# Patient Record
Sex: FEMALE | Race: WHITE | NOT HISPANIC OR LATINO | Employment: OTHER | URBAN - METROPOLITAN AREA
[De-identification: names, ages, dates, MRNs, and addresses within clinical notes are randomized per-mention and may not be internally consistent; named-entity substitution may affect disease eponyms.]

---

## 2017-09-26 ENCOUNTER — APPOINTMENT (OUTPATIENT)
Dept: WOMENS IMAGING | Facility: HOSPITAL | Age: 72
End: 2017-09-26

## 2017-09-26 PROCEDURE — 77063 BREAST TOMOSYNTHESIS BI: CPT | Performed by: RADIOLOGY

## 2017-09-26 PROCEDURE — G0202 SCR MAMMO BI INCL CAD: HCPCS | Performed by: RADIOLOGY

## 2018-10-03 ENCOUNTER — APPOINTMENT (OUTPATIENT)
Dept: WOMENS IMAGING | Facility: HOSPITAL | Age: 73
End: 2018-10-03

## 2018-10-03 PROCEDURE — 77067 SCR MAMMO BI INCL CAD: CPT | Performed by: RADIOLOGY

## 2018-10-03 PROCEDURE — 77063 BREAST TOMOSYNTHESIS BI: CPT | Performed by: RADIOLOGY

## 2018-10-25 ENCOUNTER — APPOINTMENT (OUTPATIENT)
Dept: WOMENS IMAGING | Facility: HOSPITAL | Age: 73
End: 2018-10-25

## 2018-10-25 PROCEDURE — 77080 DXA BONE DENSITY AXIAL: CPT | Performed by: RADIOLOGY

## 2019-10-11 ENCOUNTER — APPOINTMENT (OUTPATIENT)
Dept: WOMENS IMAGING | Facility: HOSPITAL | Age: 74
End: 2019-10-11

## 2019-10-11 PROCEDURE — 77067 SCR MAMMO BI INCL CAD: CPT | Performed by: RADIOLOGY

## 2019-10-11 PROCEDURE — 77063 BREAST TOMOSYNTHESIS BI: CPT | Performed by: RADIOLOGY

## 2020-10-13 ENCOUNTER — APPOINTMENT (OUTPATIENT)
Dept: WOMENS IMAGING | Facility: HOSPITAL | Age: 75
End: 2020-10-13

## 2020-10-13 PROCEDURE — 77067 SCR MAMMO BI INCL CAD: CPT | Performed by: RADIOLOGY

## 2020-10-13 PROCEDURE — 77063 BREAST TOMOSYNTHESIS BI: CPT | Performed by: RADIOLOGY

## 2021-10-26 ENCOUNTER — APPOINTMENT (OUTPATIENT)
Dept: WOMENS IMAGING | Facility: HOSPITAL | Age: 76
End: 2021-10-26

## 2021-10-26 PROCEDURE — 77067 SCR MAMMO BI INCL CAD: CPT | Performed by: RADIOLOGY

## 2021-10-26 PROCEDURE — 77063 BREAST TOMOSYNTHESIS BI: CPT | Performed by: RADIOLOGY

## 2021-11-30 ENCOUNTER — OFFICE VISIT (OUTPATIENT)
Dept: CARDIOLOGY | Facility: CLINIC | Age: 76
End: 2021-11-30

## 2021-11-30 VITALS
WEIGHT: 236.6 LBS | HEART RATE: 66 BPM | HEIGHT: 67 IN | SYSTOLIC BLOOD PRESSURE: 170 MMHG | DIASTOLIC BLOOD PRESSURE: 84 MMHG | BODY MASS INDEX: 37.13 KG/M2

## 2021-11-30 DIAGNOSIS — E66.01 CLASS 2 SEVERE OBESITY DUE TO EXCESS CALORIES WITH SERIOUS COMORBIDITY AND BODY MASS INDEX (BMI) OF 37.0 TO 37.9 IN ADULT (HCC): ICD-10-CM

## 2021-11-30 DIAGNOSIS — R07.2 PRECORDIAL PAIN: Primary | ICD-10-CM

## 2021-11-30 DIAGNOSIS — M33.90 DERMATOMYOSITIS (HCC): ICD-10-CM

## 2021-11-30 DIAGNOSIS — I10 ESSENTIAL HYPERTENSION: ICD-10-CM

## 2021-11-30 PROBLEM — E66.812 CLASS 2 SEVERE OBESITY DUE TO EXCESS CALORIES WITH SERIOUS COMORBIDITY AND BODY MASS INDEX (BMI) OF 37.0 TO 37.9 IN ADULT: Status: ACTIVE | Noted: 2021-11-30

## 2021-11-30 PROBLEM — M33.13 DERMATOMYOSITIS: Status: ACTIVE | Noted: 2021-11-30

## 2021-11-30 PROCEDURE — 99204 OFFICE O/P NEW MOD 45 MIN: CPT | Performed by: INTERNAL MEDICINE

## 2021-11-30 PROCEDURE — 93000 ELECTROCARDIOGRAM COMPLETE: CPT | Performed by: INTERNAL MEDICINE

## 2021-11-30 RX ORDER — HYDROXYCHLOROQUINE SULFATE 200 MG/1
TABLET, FILM COATED ORAL DAILY
COMMUNITY

## 2021-11-30 NOTE — PROGRESS NOTES
Date of Office Visit: 21  Encounter Provider: Chuck Jones MD  Place of Service: [facility] CARDIOLOGY  Patient Name: Marlee Brooks  :1945  [id number]    Chief Complaint   Patient presents with   • Chest Pain   :     HPI: Marlee Brooks is a 76 y.o. female she is here as a new patient for evaluation of chest pain. She has a history of dermatomyositis and is on chronic hydroxychloroquine she has had a history of hypertension no history of diabetes tobacco abuse. She is an artist and teaches it. She was walking about a week ago [place] and got chest discomfort and then short of breath probably lasted about 10 minutes she had to sit down and then it resolved in about 5 minutes. She did not have any nausea or vomiting but she is recently had some belching about a month ago she got into some poison oak and had a real issue with that and has had to have steroids a couple of times. She is never had heart problems before and she feels pretty good now her blood pressure usually goes up when she goes to the physician    Past Medical History:   Diagnosis Date   • Asthma    • Bilateral hearing loss    • Dermatomyositis (HCC)    • Hypertension    • Obstructive sleep apnea        Past Surgical History:   Procedure Laterality Date   • CATARACT EXTRACTION     • OVARIAN CYST REMOVAL         Social History     Socioeconomic History   • Marital status: Single   Tobacco Use   • Smoking status: Former Smoker     Quit date:      Years since quittin.9   • Smokeless tobacco: Never Used   • Tobacco comment: QUIT IN    Substance and Sexual Activity   • Alcohol use: No       Family History   Problem Relation Age of Onset   • No Known Problems Father          at 32 of Car Accident   • Hypertension Mother    • Colon cancer Sister    • Lung cancer Sister        Review of Systems   Constitutional: Negative for decreased appetite, fever,  malaise/fatigue and weight loss.   HENT: Negative for nosebleeds.    Eyes: Negative for double vision.   Cardiovascular: Negative for chest pain, claudication, cyanosis, dyspnea on exertion, irregular heartbeat, leg swelling, near-syncope, orthopnea, palpitations, paroxysmal nocturnal dyspnea and syncope.   Respiratory: Negative for cough, hemoptysis and shortness of breath.    Hematologic/Lymphatic: Negative for bleeding problem.   Skin: Negative for rash.   Musculoskeletal: Negative for falls and myalgias.   Gastrointestinal: Negative for hematochezia, jaundice, melena, nausea and vomiting.   Genitourinary: Negative for hematuria.   Neurological: Negative for dizziness and seizures.   Psychiatric/Behavioral: Negative for altered mental status and memory loss.       Allergies   Allergen Reactions   • Sulfa Antibiotics Shortness Of Breath, Rash and Swelling     Category: Adverse Reaction; Annotation - [id number]: lesions on body     • Ace Inhibitors Cough     COUGH  Category: Adverse Reaction;   COUGH     • Azathioprine Diarrhea     Category: Allergy;    • Cephalexin Hallucinations     HALLUCINATIONS  HALLUCINATIONS     • Hyoscyamine Hallucinations     HALLUCINATIONS  HALLUCINATIONS     • Phenazopyridine Other (See Comments)     Category: Adverse Reaction; Annotation - [id number]: issue with her eyes   • Triamterene-Hctz Swelling     Category: Adverse Reaction; Annotation - [id number]: lesions on body   • Valsartan Cough     Category: Allergy;          Current Outpatient Medications:   •  amLODIPine (NORVASC) 5 MG tablet, Take 5 mg by mouth Daily., Disp: , Rfl:   •  hydroxychloroquine (PLAQUENIL) 200 MG tablet, Take  by mouth Daily., Disp: , Rfl:   •  nebivolol (BYSTOLIC) 20 MG tablet, Take 20 mg by mouth Daily., Disp: , Rfl:   •  azithromycin (ZITHROMAX Z-OBIE) 250 MG tablet, Take 2 tablets at the same time on Day 1 and then 1 tablet every 24 hours thereafter., Disp: 6 tablet, Rfl: 1  •  MethylPREDNISolone (MEDROL) 4  "MG tablet, follow package directions, Disp: 1 each, Rfl: 0      Objective:     Vitals:    11/30/21 1532   BP: 170/84   Pulse: 66   Weight: 107 kg (236 lb 9.6 oz)   Height: 168.9 cm (66.5\")     Body mass index is 37.62 kg/m².    Constitutional:       Appearance: Well-developed.   Eyes:      General: No scleral icterus.  HENT:      Head: Normocephalic.   Neck:      Thyroid: No thyromegaly.      Vascular: No JVD.      Lymphadenopathy: No cervical adenopathy.   Pulmonary:      Effort: Pulmonary effort is normal.      Breath sounds: Normal breath sounds. No wheezing. No rales.   Cardiovascular:      Normal rate. Regular rhythm.      No gallop.   Edema:     Peripheral edema absent.   Abdominal:      Palpations: Abdomen is soft.      Tenderness: There is no abdominal tenderness.   Musculoskeletal: Normal range of motion. Skin:     General: Skin is warm and dry.      Findings: No rash.   Neurological:      Mental Status: Alert and oriented to person, place, and time.           ECG 12 Lead    Date/Time: 11/30/2021 4:27 PM  Performed by: Chuck Jones MD  Authorized by: Chuck Jones MD   Previous ECG: no previous ECG available  Rhythm: sinus rhythm    Clinical impression: normal ECG             Assessment:       Diagnosis Plan   1. Precordial pain     2. Essential hypertension     3. Class 2 severe obesity due to excess calories with serious comorbidity and body mass index (BMI) of 37.0 to 37.9 in adult (HCC)     4. Dermatomyositis (HCC)            Plan:       Well her EKG looks normal her exam is normal but her story is a little concerning she is definitely intermediate risk with her hypertension and her obesity I feel like we probably ought to get a stress test on her and make sure she is okay. I am going to do a treadmill Cardiolite and if that is normal then I think we watch things if it is not then we will have to consider a cardiac cath it depends on how abnormal it is    As always, it has been a pleasure to " participate in your patient's care.      Sincerely,       Chuck Jones MD

## 2022-09-18 ENCOUNTER — APPOINTMENT (OUTPATIENT)
Dept: GENERAL RADIOLOGY | Facility: HOSPITAL | Age: 77
End: 2022-09-18

## 2022-09-18 ENCOUNTER — HOSPITAL ENCOUNTER (EMERGENCY)
Facility: HOSPITAL | Age: 77
Discharge: HOME | End: 2022-09-18
Attending: EMERGENCY MEDICINE | Admitting: EMERGENCY MEDICINE

## 2022-09-18 ENCOUNTER — APPOINTMENT (OUTPATIENT)
Dept: CT IMAGING | Facility: HOSPITAL | Age: 77
End: 2022-09-18

## 2022-09-18 VITALS
SYSTOLIC BLOOD PRESSURE: 183 MMHG | WEIGHT: 220 LBS | HEIGHT: 60 IN | HEART RATE: 64 BPM | DIASTOLIC BLOOD PRESSURE: 74 MMHG | OXYGEN SATURATION: 97 % | BODY MASS INDEX: 43.19 KG/M2 | RESPIRATION RATE: 16 BRPM | TEMPERATURE: 99.1 F

## 2022-09-18 DIAGNOSIS — T07.XXXA ABRASIONS OF MULTIPLE SITES: ICD-10-CM

## 2022-09-18 DIAGNOSIS — S16.1XXA STRAIN OF NECK MUSCLE, INITIAL ENCOUNTER: ICD-10-CM

## 2022-09-18 DIAGNOSIS — S09.90XA CLOSED HEAD INJURY, INITIAL ENCOUNTER: Primary | ICD-10-CM

## 2022-09-18 DIAGNOSIS — S00.83XA CONTUSION OF FACE, INITIAL ENCOUNTER: ICD-10-CM
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
## 2022-09-18 PROCEDURE — 99283 EMERGENCY DEPT VISIT LOW MDM: CPT

## 2022-09-18 PROCEDURE — 70486 CT MAXILLOFACIAL W/O DYE: CPT

## 2022-09-18 PROCEDURE — 72125 CT NECK SPINE W/O DYE: CPT

## 2022-09-18 PROCEDURE — 70450 CT HEAD/BRAIN W/O DYE: CPT

## 2022-09-18 PROCEDURE — 73110 X-RAY EXAM OF WRIST: CPT

## 2022-09-18 PROCEDURE — 73560 X-RAY EXAM OF KNEE 1 OR 2: CPT

## 2022-09-18 NOTE — ED NOTES
Pt to ED via EMS from [facility]. Pt had mechanical fall at the mall and hit L side of head. Pt reports she tripped on a crack. Denies LOC. Denies blood thinners. Pt alert and oriented x4. Hx hypertension.

## 2022-09-18 NOTE — ED PROVIDER NOTES
EMERGENCY DEPARTMENT ENCOUNTER    Room Number:  01/01  Date of encounter:  9/18/2022  PCP: Sheryl Rubin DO  Historian: Patient        PPE    Patient was placed in face mask in first look. Patient was wearing facemask when I entered the room and throughout our encounter. I wore full protective equipment throughout this patient encounter including a face mask, and gloves. Hand hygiene was performed before donning protective equipment and after removal when leaving the room.        HPI:  Chief Complaint: Fall  A complete HPI/ROS/PMH/PSH/SH/FH are unobtainable due to: Nothing    Context: Marlee Brooks is a 77 y.o. female with a history of hypertension who arrives to the ED via EMS.  Patient presents with c/o head injury status post a trip and fall prior to arrival.  Patient states that she was walking out of [place] when she tripped on the sidewalk causing her to fall forward and hit her head and face on the sidewalk.  She states she was able to get up with assistance and has been able to ambulate since the fall.   Patient also complains of left-sided facial pain, facial abrasion, left knee pain, right wrist pain and neck pain.  Patient states she also has abrasions to her left knee and her right great toe.  Patient denies LOC, dizziness, lightheadedness, nausea, vomiting, visual changes, headache, back pain, symptoms prior to the fall including chest pain or shortness of breath.  Patient states that nothing makes the symptoms better and movement worsens symptoms.    Patient states her tetanus shot is up-to-date and she is not anticoagulated.      PAST MEDICAL HISTORY  Active Ambulatory Problems     Diagnosis Date Noted   • Essential hypertension 11/30/2021   • Class 2 severe obesity due to excess calories with serious comorbidity and body mass index (BMI) of 37.0 to 37.9 in adult ([facility]) 11/30/2021   • Dermatomyositis ([facility]) 11/30/2021     Resolved Ambulatory Problems     Diagnosis Date Noted   • No  Resolved Ambulatory Problems     Past Medical History:   Diagnosis Date   • Asthma    • Bilateral hearing loss    • Hypertension    • Obstructive sleep apnea          PAST SURGICAL HISTORY  Past Surgical History:   Procedure Laterality Date   • CATARACT EXTRACTION     • OVARIAN CYST REMOVAL           FAMILY HISTORY  Family History   Problem Relation Age of Onset   • No Known Problems Father          at 32 of Car Accident   • Hypertension Mother    • Colon cancer Sister    • Lung cancer Sister          SOCIAL HISTORY  Social History     Socioeconomic History   • Marital status:    Tobacco Use   • Smoking status: Former Smoker     Quit date:      Years since quittin.7   • Smokeless tobacco: Never Used   • Tobacco comment: QUIT IN    Substance and Sexual Activity   • Alcohol use: No         ALLERGIES  Sulfa antibiotics, Ace inhibitors, Azathioprine, Cephalexin, Hyoscyamine, Phenazopyridine, Triamterene-hctz, and Valsartan        REVIEW OF SYSTEMS  Review of Systems     All systems reviewed and negative except for those discussed in HPI.        PHYSICAL EXAM    ED Triage Vitals [22 1330]   Temp Heart Rate Resp BP SpO2   99.1 °F (37.3 °C) 60 16 (!) 220/160 98 %       Physical Exam  GENERAL: Well appearing, nontoxic appearing, not distressed  HENT: normocephalic, small hematoma noted above the left eyebrow, tenderness around the left orbit and left maxilla  EYES: no scleral icterus, PERRL, EOMI, no ocular entrapment  CV: regular rhythm, regular rate, no murmur  RESPIRATORY: normal effort, CTAB  ABDOMEN: soft   MUSCULOSKELETAL: no deformity  Cervical vertebral tenderness to palpation  No step off or crepitus noted  Bilateral cervical paraspinal tenderness to palpation  Bilateral equal handgrips, normal sensation to upper extremities  Patient has tenderness to the posterior aspect of the right wrist with small bruise noted.  Normal range of motion with mild tenderness.  2+ radial  pulse.  Tenderness to the left knee, able to raise leg off stretcher, normal range of motion, no swelling or deformity noted.  NEURO: alert, moves all extremities, follows commands, mental status normal/baseline  SKIN: warm, dry, no rash, multiple facial abrasions noted to the left side of the nose, abrasion noted to the left knee and right great toe  Psych: Appropriate mood and affect  Nursing notes and vital signs reviewed      LAB RESULTS  No results found for this or any previous visit (from the past 24 hour(s)).    Ordered the above labs and independently reviewed the results.      RADIOLOGY  XR Wrist 3+ View Right    Result Date: 9/18/2022  XR WRIST 3+ VW RIGHT-  09/18/2022  HISTORY: Fell, right wrist injury with pain.  4 views of the right wrist demonstrate no acute bone, joint or soft tissue abnormalities.  This report was finalized on 9/18/2022 4:28 PM by Dr. Nadeem Araujo M.D.      XR Knee 1 or 2 View Left    Result Date: 9/18/2022  XR KNEE 1 OR 2 VW LEFT-  09/18/2022  HISTORY: Fell, left knee pain.    There is mild medial tibiofemoral joint space narrowing. Mild hypertrophic changes are seen in the knee. No fracture or other acute bony abnormality is seen.      1. Mild degenerative arthritis of the left knee. 2. No acute bony abnormality is seen.  This report was finalized on 9/18/2022 4:27 PM by Dr. Nadeem Araujo M.D.      CT Head Without Contrast, CT Cervical Spine Without Contrast, CT Facial Bones Without Contrast    Result Date: 9/18/2022  HISTORY: Fell. Head injury. Facial bone trauma. Neck pain.  CT OF THE BRAIN WITHOUT CONTRAST 09/18/2022  Axial images were obtained through the brain without intravenous contrast.  FINDINGS: The brain parenchyma and ventricular system appear within normal limits. No mass lesions, midline shift, intracranial hemorrhage or evidence of infarction is demonstrated.  No skull fractures are seen.      No acute process.  CT OF THE FACIAL BONES WITHOUT CONTRAST  09/18/2022  Axial images were obtained through the facial bones. Sagittal and coronal reconstruction images were reviewed.  There is mild soft tissue swelling over the left frontal region.  No facial bone fractures are seen. Intraorbital contents appear within normal limits. Paranasal sinuses appear clear except for minimal mucosal thickening in the left maxillary sinus.  IMPRESSION: No facial bone fractures are seen.  CT OF THE CERVICAL SPINE WITHOUT CONTRAST  Axial images were obtained from the skull base to the upper thoracic spine. Sagittal and coronal reconstruction images were reviewed.  FINDINGS: There is mild degenerative disease of the C1-C2 articulation.  There is slight reversal of the cervical lordosis. There is mild to moderate C5-6 and C6-7 degenerative disc disease. Mental retrolisthesis of C6 on C7 is seen. There appears be minimal canal stenosis at C5-6.  No other subluxation is seen.  No cervical spine fractures are seen. There is multilevel facet joint arthropathy.  IMPRESSION: 1. Cervical degenerative disease and slight reversal of the cervical lordosis as described. 2. No fractures are seen.  Radiation dose reduction techniques were utilized, including automated exposure control and exposure modulation based on body size.         I ordered the above noted radiological studies and viewed the images on the PACS system.           MEDICAL RECORD REVIEW  Records reviewed in [facility], patient last saw her PMD on September 6, 2022 for hypertension, she takes Norvasc 5 mg.      PROCEDURES    Procedures        DIFFERENTIAL DIAGNOSIS  Differential Diagnosis for head injury include but are not limited to the following:  Cerebral contusion, Concussion ( with or without LOC), Head contusion, Skull fracture, orbital fracture, Hematoma, Intracranial Hemorrhage, Laceration, Post Concussion Syndrome.         PROGRESS, DATA ANALYSIS, CONSULTS, AND MEDICAL DECISION MAKING        ED Course as of 09/18/22 1929   [place] Sep  18, 2022   1611 Patient is well-appearing 77-year-old who presents today after a [place] and fall at the mall.  CTs of the head to rule out intercranial abnormality, cervical spine and facial bones to rule out bony abnormalities.  X-rays of the right wrist and left knee to rule out fracture.  Patient's tetanus shot is up-to-date.  We will clean her abrasions. [MS]   1727 Reviewed pt's history and workup with Dr. Raya.  After a bedside evaluation, he agrees with the plan of care.     [MS]   1807 Patient updated on unremarkable work-up today, CTAs of the head, cervical spine and facial bones were all unremarkable.  Patient's right wrist and left knee x-rays showed no fracture or bony abnormality. [MS]      ED Course User Index  [MS] Robyn Hernández APRN     Discussed plan for discharge, as there is no emergent indication for admission. Pt/family is agreeable and understands need for follow up and repeat testing.  Pt is aware that discharge does not mean that nothing is wrong but it indicates no emergency is present that requires admission and they must continue care with follow-up as given below or physician of their choice.   Patient/Family voiced understanding of above instructions.  Patient discharged in stable condition.    DIAGNOSIS  Final diagnoses:   Closed head injury, initial encounter   Strain of neck muscle, initial encounter   Contusion of face, initial encounter   Abrasions of multiple sites   Fall, initial encounter       FOLLOW UP   Sheryl Rubin DO  [address] [address] [phone]    Schedule an appointment as soon as possible for a visit         RX     Medication List      No changes were made to your prescriptions during this visit.             MEDICATIONS GIVEN IN ED    Medications - No data to display        COURSE & MEDICAL DECISION MAKING  Any/All labs and Any/All Imaging studies that were ordered were reviewed and are noted above.  Results  were reviewed/discussed with the patient and they were also made aware of online access.    Pt also made aware that some labs, such as cultures, will not be resulted during ER visit and followup with PMD is necessary.        Robyn Hernández, APRN  09/18/22 1929

## 2022-09-18 NOTE — DISCHARGE INSTRUCTIONS
Follow up with your primary care doctor within 48-72 hours. Rest. Take Acetaminophen (Tylenol) every 4-6 hours, as needed, for pain.  Clean abrasions daily with soap and water and apply antibiotic ointment.  Ice to painful areas for the next 24 to 48 hours.  Often individuals develop a headache associated with nausea in the days/hours after a head injury. This is called a concussion and does not warrant a return to the ED UNLESS: you develop significant worsening of pain, profuse vomiting, dizziness, changes in vision, difficulty walking/speaking, weakness or numbness to your extremities.

## 2022-09-18 NOTE — ED PROVIDER NOTES
MD ATTESTATION NOTE    The BEC[facility] and I have discussed this patient's history, physical exam, and treatment plan.  I have reviewed the documentation and personally had a face to face interaction with the patient. I affirm the documentation and agree with the treatment and plan.  The attached note describes my personal findings.      I provided a substantive portion of the care of the patient.  I personally performed the physical exam in its entirety, and below are my findings.  For this patient encounter, the patient wore surgical mask, I wore full protective PPE including N95 and eye protection.      Brief HPI: Patient presents to the ED after mechanical fall.  [place] she [place] on the sidewalk.  She fell forward and hit her head and left side of her face.  Denies loss of consciousness.  She is not on anticoagulants.  She complains of headache, left facial pain, left knee pain, right wrist pain, and neck pain.  Patient has been able to ambulate since falling.    PHYSICAL EXAM  ED Triage Vitals [09/18/22 1330]   Temp Heart Rate Resp BP SpO2   99.1 °F (37.3 °C) 60 16 (!) 220/160 98 %      Temp src Heart Rate Source Patient Position BP Location FiO2 (%)   Oral -- -- -- --         GENERAL: Awake, alert, oriented x3.  Well-developed, well-nourished female.  Resting comfortably in no acute distress  HENT: There is a left brow hematoma.  There is an abrasion on the left side of the nose.  There is tenderness of the left face.  Midface is stable.  EYES: EOMI  CV: regular rhythm, normal rate  RESPIRATORY: normal effort, clear to auscultation bilaterally  ABDOMEN: soft, nontender  MUSCULOSKELETAL: There is mild tenderness over the upper C-spine.  No step-off.  T and L-spine are nontender.  Chest is nontender.  There is a small bruise and tenderness over the volar aspect of the right wrist.  There is mild left knee tenderness.  There is full range of motion all extremities.  NEURO: GCS 15.  Speech  is clear and fluent.  Normal strength and light touch sensation all extremities  PSYCH:  calm, cooperative  SKIN: warm, dry    Vital signs and nursing notes reviewed.        Plan: Obtain imaging studies    Imaging studies are negative acute.  Patient will be discharged     Chuck Raya MD  09/18/22 3444

## 2022-12-13 ENCOUNTER — APPOINTMENT (OUTPATIENT)
Dept: WOMENS IMAGING | Facility: HOSPITAL | Age: 77
End: 2022-12-13

## 2022-12-13 PROCEDURE — 77063 BREAST TOMOSYNTHESIS BI: CPT | Performed by: RADIOLOGY

## 2022-12-13 PROCEDURE — 77067 SCR MAMMO BI INCL CAD: CPT | Performed by: RADIOLOGY

## 2024-01-23 ENCOUNTER — TELEPHONE (OUTPATIENT)
Dept: CARDIOLOGY | Facility: CLINIC | Age: 79
End: 2024-01-23

## 2024-01-23 NOTE — TELEPHONE ENCOUNTER
Caller: Marlee Brooks    Relationship to patient: Self    Best call back number: [phone]    Chief complaint: ARRHYTHMIAS     Type of visit: FOLLOW UP     Requested date: ASAP, AFTERNOONS PREFERRED     Additional notes: PT STATES SHE WENT TO THE [facility] ER ON 1.9.24, SHE WAS HAVING ARRHYTHMIAS. SHE WOULD LIKE TO GET IN TO SEE DR LAGUNAS AFTER BEING DISCHARGED, PT HAS WORN A HOLTER MONITOR AND DR RAY, PCP HAS THE RESULTS THAT SHE WILL BE SENDING OVER. PLEASE ADVISE WHEN TO GET PT IN AND CALL BACK TO SCHEDULE.

## 2024-01-30 ENCOUNTER — OFFICE VISIT (OUTPATIENT)
Dept: CARDIOLOGY | Facility: CLINIC | Age: 79
End: 2024-01-30
Payer: MEDICARE

## 2024-01-30 VITALS
BODY MASS INDEX: 36.88 KG/M2 | SYSTOLIC BLOOD PRESSURE: 180 MMHG | DIASTOLIC BLOOD PRESSURE: 80 MMHG | HEIGHT: 67 IN | WEIGHT: 235 LBS | HEART RATE: 59 BPM

## 2024-01-30 DIAGNOSIS — R00.2 PALPITATIONS: Primary | ICD-10-CM

## 2024-01-30 PROCEDURE — 93000 ELECTROCARDIOGRAM COMPLETE: CPT | Performed by: INTERNAL MEDICINE

## 2024-01-30 PROCEDURE — 3079F DIAST BP 80-89 MM HG: CPT | Performed by: INTERNAL MEDICINE

## 2024-01-30 PROCEDURE — 99214 OFFICE O/P EST MOD 30 MIN: CPT | Performed by: INTERNAL MEDICINE

## 2024-01-30 PROCEDURE — 3077F SYST BP >= 140 MM HG: CPT | Performed by: INTERNAL MEDICINE

## 2024-01-30 RX ORDER — AMLODIPINE BESYLATE 5 MG/1
5 TABLET ORAL DAILY
COMMUNITY

## 2024-01-30 NOTE — PROGRESS NOTES
Date of Office Visit: 24  Encounter Provider: Chuck Jones MD  Place of Service: [facility] CARDIOLOGY  Patient Name: Marlee Brooks  :1945  [id number]    Chief Complaint   Patient presents with    Irregular Heart Beat        HPI: Marlee Brooks is a 78 y.o. female she comes here for evaluation of palpitations.  She has had some palpitations she had a viral URI got placed on antibiotic and steroids and then really kind of noticed of some palpitations that led to a Holter with about like 270 APCs and a few PVCs no sustained bradycardia or tachyarrhythmias pretty unremarkable.  She still feels some of these.  She comes in with a long list of her blood pressures which look fantastic.  She has not had any dizziness no syncope otherwise she is doing well    Past Medical History:   Diagnosis Date    Asthma     Bilateral hearing loss     Dermatomyositis     Hypertension     Obstructive sleep apnea        Past Surgical History:   Procedure Laterality Date    CATARACT EXTRACTION      OVARIAN CYST REMOVAL         Social History     Socioeconomic History    Marital status:    Tobacco Use    Smoking status: Former     Types: Cigarettes     Quit date:      Years since quittin.1    Smokeless tobacco: Never    Tobacco comments:     QUIT IN    Vaping Use    Vaping Use: Never used   Substance and Sexual Activity    Alcohol use: No    Drug use: Defer    Sexual activity: Defer       Family History   Problem Relation Age of Onset    No Known Problems Father          at 32 of Car Accident    Hypertension Mother     Colon cancer Sister     Lung cancer Sister        Review of Systems   Constitutional: Negative for decreased appetite, fever, malaise/fatigue and weight loss.   HENT:  Negative for nosebleeds.    Eyes:  Negative for double vision.   Cardiovascular:  Negative for chest pain, claudication, cyanosis, dyspnea on exertion, irregular heartbeat, leg  swelling, near-syncope, orthopnea, palpitations, paroxysmal nocturnal dyspnea and syncope.   Respiratory:  Negative for cough, hemoptysis and shortness of breath.    Hematologic/Lymphatic: Negative for bleeding problem.   Skin:  Negative for rash.   Musculoskeletal:  Negative for falls and myalgias.   Gastrointestinal:  Negative for hematochezia, jaundice, melena, nausea and vomiting.   Genitourinary:  Negative for hematuria.   Neurological:  Negative for dizziness and seizures.   Psychiatric/Behavioral:  Negative for altered mental status and memory loss.        Allergies   Allergen Reactions    Sulfa Antibiotics Shortness Of Breath, Rash and Swelling     Category: Adverse Reaction; Annotation - [id number]: lesions on body      Ace Inhibitors Cough     COUGH  Category: Adverse Reaction;   COUGH      Azathioprine Diarrhea     Category: Allergy;     Cephalexin Hallucinations     HALLUCINATIONS  HALLUCINATIONS      Hyoscyamine Hallucinations     HALLUCINATIONS  HALLUCINATIONS      Phenazopyridine Other (See Comments)     Category: Adverse Reaction; Annotation - [id number]: issue with her eyes    Triamterene-Hctz Swelling     Category: Adverse Reaction; Annotation - [id number]: lesions on body    Valsartan Cough     Category: Allergy;          Current Outpatient Medications:     amLODIPine (NORVASC) 5 MG tablet, Take 1 tablet by mouth Daily., Disp: , Rfl:     nebivolol (BYSTOLIC) 10 MG tablet, Take 1 tablet by mouth Daily., Disp: , Rfl:     multivitamin with minerals tablet tablet, Take  by mouth Daily. (Patient not taking: Reported on 1/30/2024), Disp: , Rfl:     predniSONE (DELTASONE) 10 MG (21) dose pack, Use as directed on package (Patient not taking: Reported on 1/30/2024), Disp: 21 each, Rfl: 0    promethazine-dextromethorphan (PROMETHAZINE-DM) 6.25-15 MG/5ML syrup, 5 ml PO Q 4-6 hours prn cough.  Max:  30 ml per 24 hours. (Patient not taking: Reported on 1/30/2024), Disp: 240 mL, Rfl: 0      Objective:     Vitals:  "   01/30/24 1537   BP: 180/80   Pulse: 59   Weight: 107 kg (235 lb)   Height: 170.2 cm (67\")     Body mass index is 36.81 kg/m².    Constitutional:       Appearance: Well-developed.   Eyes:      General: No scleral icterus.  HENT:      Head: Normocephalic.   Neck:      Thyroid: No thyromegaly.      Vascular: No JVD.      Lymphadenopathy: No cervical adenopathy.   Pulmonary:      Effort: Pulmonary effort is normal.      Breath sounds: Normal breath sounds. No wheezing. No rales.   Cardiovascular:      Normal rate. Regular rhythm.      No gallop.    Edema:     Peripheral edema absent.   Abdominal:      Palpations: Abdomen is soft.      Tenderness: There is no abdominal tenderness.   Musculoskeletal: Normal range of motion. Skin:     General: Skin is warm and dry.      Findings: No rash.   Neurological:      Mental Status: Alert and oriented to person, place, and time.           ECG 12 Lead    Date/Time: 1/30/2024 4:41 PM  Performed by: Chuck Jones MD    Authorized by: Chuck Jones MD  Rhythm: sinus rhythm    Clinical impression: abnormal EKG  Comments: Junctional premature contractions           Assessment:       Diagnosis Plan   1. Palpitations               Plan:       It is interesting it looks like she is having some JPC's.  But not very many and so I think the way to go here is just to continue on the low-dose beta-blocker I am not really sure what is going on but I am fairly sure that she is fine and if she were to continue to have problems we have to reecho her and go from there but I imagine these will just meltaway because I think they have kind of gotten better since the steroids are been out of her system we will see her back as needed    No follow-ups on file.     As always, it has been a pleasure to participate in your patient's care.      Sincerely,       Chuck Jones MD                  "

## 2024-06-04 ENCOUNTER — APPOINTMENT (OUTPATIENT)
Dept: WOMENS IMAGING | Facility: HOSPITAL | Age: 79
End: 2024-06-04
Payer: MEDICARE

## 2024-06-04 PROCEDURE — 77063 BREAST TOMOSYNTHESIS BI: CPT | Performed by: RADIOLOGY

## 2024-06-04 PROCEDURE — 77067 SCR MAMMO BI INCL CAD: CPT | Performed by: RADIOLOGY

## 2024-06-19 ENCOUNTER — APPOINTMENT (OUTPATIENT)
Dept: WOMENS IMAGING | Facility: HOSPITAL | Age: 79
End: 2024-06-19
Payer: MEDICARE

## 2024-06-19 PROCEDURE — 77065 DX MAMMO INCL CAD UNI: CPT | Performed by: RADIOLOGY

## 2024-06-19 PROCEDURE — 76642 ULTRASOUND BREAST LIMITED: CPT | Performed by: RADIOLOGY

## 2024-06-19 PROCEDURE — G0279 TOMOSYNTHESIS, MAMMO: HCPCS | Performed by: RADIOLOGY

## 2024-12-19 ENCOUNTER — APPOINTMENT (OUTPATIENT)
Dept: WOMENS IMAGING | Facility: HOSPITAL | Age: 79
End: 2024-12-19
Payer: MEDICARE

## 2024-12-19 PROCEDURE — 76642 ULTRASOUND BREAST LIMITED: CPT | Performed by: RADIOLOGY

## 2025-06-19 ENCOUNTER — APPOINTMENT (OUTPATIENT)
Dept: WOMENS IMAGING | Facility: HOSPITAL | Age: 80
End: 2025-06-19
Payer: MEDICARE

## 2025-06-19 PROCEDURE — 76642 ULTRASOUND BREAST LIMITED: CPT | Performed by: RADIOLOGY

## 2025-06-19 PROCEDURE — 77066 DX MAMMO INCL CAD BI: CPT | Performed by: RADIOLOGY

## 2025-06-19 PROCEDURE — G0279 TOMOSYNTHESIS, MAMMO: HCPCS | Performed by: RADIOLOGY
